# Patient Record
Sex: FEMALE | Race: BLACK OR AFRICAN AMERICAN | NOT HISPANIC OR LATINO | URBAN - NONMETROPOLITAN AREA
[De-identification: names, ages, dates, MRNs, and addresses within clinical notes are randomized per-mention and may not be internally consistent; named-entity substitution may affect disease eponyms.]

---

## 2019-08-27 ENCOUNTER — IMPORTED ENCOUNTER (OUTPATIENT)
Dept: URBAN - NONMETROPOLITAN AREA CLINIC 1 | Facility: CLINIC | Age: 29
End: 2019-08-27

## 2019-08-27 PROBLEM — H52.13: Noted: 2019-08-27

## 2019-08-27 PROCEDURE — S0620 ROUTINE OPHTHALMOLOGICAL EXA: HCPCS

## 2019-08-27 NOTE — PATIENT DISCUSSION
Myopia OUDiscussed refractive status in detail with patient. New glasses Rx given today. Continue to monitor. Questionable Refractive Amblyopia OUPatient was very inconsistent with responses when checking vision. Recommend full time wear of glasses. Continue to monitor.

## 2022-04-09 ASSESSMENT — TONOMETRY
OD_IOP_MMHG: 15
OS_IOP_MMHG: 15

## 2022-04-09 ASSESSMENT — VISUAL ACUITY
OS_CC: CF4'
OD_CC: CF4'